# Patient Record
Sex: FEMALE | Race: WHITE | NOT HISPANIC OR LATINO | Employment: UNEMPLOYED | URBAN - METROPOLITAN AREA
[De-identification: names, ages, dates, MRNs, and addresses within clinical notes are randomized per-mention and may not be internally consistent; named-entity substitution may affect disease eponyms.]

---

## 2022-12-28 PROBLEM — R79.89 LFT ELEVATION: Status: ACTIVE | Noted: 2022-12-28

## 2022-12-28 PROBLEM — E87.1 HYPONATREMIA: Status: ACTIVE | Noted: 2022-12-28

## 2022-12-28 PROBLEM — N18.30 CKD (CHRONIC KIDNEY DISEASE) STAGE 3, GFR 30-59 ML/MIN: Status: ACTIVE | Noted: 2022-12-28

## 2022-12-28 PROBLEM — R09.89 SUSPECTED CHF (CONGESTIVE HEART FAILURE): Status: ACTIVE | Noted: 2022-12-28

## 2022-12-28 PROBLEM — D72.829 LEUKOCYTOSIS: Status: ACTIVE | Noted: 2022-12-28

## 2022-12-29 PROBLEM — I50.20 HFREF (HEART FAILURE WITH REDUCED EJECTION FRACTION) (HCC): Status: ACTIVE | Noted: 2022-12-28

## 2022-12-30 PROBLEM — E87.1 HYPONATREMIA: Status: RESOLVED | Noted: 2022-12-28 | Resolved: 2022-12-30

## 2023-01-06 PROBLEM — I10 HYPERTENSION: Status: ACTIVE | Noted: 2023-01-06

## 2023-01-06 PROBLEM — Z76.89 ENCOUNTER TO ESTABLISH CARE: Status: ACTIVE | Noted: 2023-01-06

## 2023-02-22 ENCOUNTER — TELEPHONE (OUTPATIENT)
Dept: CARDIOLOGY | Facility: MEDICAL CENTER | Age: 78
End: 2023-02-22
Payer: MEDICARE

## 2023-02-22 DIAGNOSIS — E87.1 HYPONATREMIA: ICD-10-CM

## 2023-02-23 NOTE — TELEPHONE ENCOUNTER
----- Message from ERIK Moore sent at 2/22/2023  7:41 AM PST -----  I sent a message to both Arnold and Ednisha yesterday.  Please discuss fluid restriction of 2 L daily max with patient and recheck BMP in 1 week.  ----- Message -----  From: Brenda Ryan R.N.  Sent: 2/21/2023   5:03 PM PST  To: ERIK Moore    To JJOSE, FV scheduled with you 5/2/23.  Please advise on sodium level, thank you!

## 2023-02-23 NOTE — TELEPHONE ENCOUNTER
Called pt, [phone], to review APRN recommendations.  Pt verbalizes understanding and states no other concerns or questions at this time.  Answered all questions asked.  Pt is appreciative of information given.    BMP ordered.

## 2023-03-02 ENCOUNTER — TELEPHONE (OUTPATIENT)
Dept: CARDIOLOGY | Facility: MEDICAL CENTER | Age: 78
End: 2023-03-02
Payer: MEDICARE

## 2023-03-02 DIAGNOSIS — E87.1 HYPONATREMIA: ICD-10-CM

## 2023-03-03 NOTE — TELEPHONE ENCOUNTER
Phone Number Called: [phone]    Call outcome: Spoke to patient regarding message below.    Message: Called to inform patient of JG recommendations.     Patient verbalized understanding. Patient reports no symptoms, just feeling dehydrated. No dizziness, or confusion noted.     BMP re-ordered for patient.     ER precautions advised to patient. Patient verbalized understanding.       ----- Message from ERKI Moore sent at 3/2/2023 10:16 AM PST -----  Regarding: Hyponatremia  Hold spironolactone.  Continue fluid restriction, but reduced to 1.5 L.  No sodium restriction.  Recheck BMP nonfasting in 1 week.  ER precautions for confusion, dizziness, disorientation.  ----- Message -----  From: Latosha Rosales R.N.  Sent: 3/2/2023   8:21 AM PST  To: ERIK Moore    To:     Na [address]. Please advise. Thank you

## 2024-01-18 DIAGNOSIS — I50.20 HFREF (HEART FAILURE WITH REDUCED EJECTION FRACTION) (HCC): ICD-10-CM

## 2024-01-18 DIAGNOSIS — I10 HYPERTENSION, UNSPECIFIED TYPE: ICD-10-CM

## 2024-01-18 NOTE — TELEPHONE ENCOUNTER
RICK    Received request via: Patient    Was the patient seen in the last year in this department? Yes    Does the patient have an active prescription (recently filled or refills available) for medication(s) requested?  ABOUT 10 DAYS LEFT    Does the patient have nursing home Plus and need 100 day supply (blood pressure, diabetes and cholesterol meds only)? Patient does not have SCP   [Abnormal CXR/ Chest CT] : an abnormal CXR/ chest CT [Follow-Up - From Hospitalization] : a follow-up visit after a recent hospitalization [COPD] : COPD

## 2024-01-19 RX ORDER — LISINOPRIL 10 MG/1
10 TABLET ORAL DAILY
Qty: 90 TABLET | Refills: 0 | Status: SHIPPED | OUTPATIENT
Start: 2024-01-19 | End: 2024-02-20

## 2024-01-19 RX ORDER — METOPROLOL SUCCINATE 50 MG/1
50 TABLET, EXTENDED RELEASE ORAL DAILY
Qty: 90 TABLET | Refills: 0 | Status: SHIPPED | OUTPATIENT
Start: 2024-01-19 | End: 2024-02-20 | Stop reason: SDUPTHER

## 2025-02-06 ENCOUNTER — TELEPHONE (OUTPATIENT)
Dept: CARDIOLOGY | Facility: MEDICAL CENTER | Age: 80
End: 2025-02-06
Payer: MEDICARE

## 2025-02-06 DIAGNOSIS — I10 HYPERTENSION, UNSPECIFIED TYPE: ICD-10-CM

## 2025-02-06 DIAGNOSIS — I50.20 HFREF (HEART FAILURE WITH REDUCED EJECTION FRACTION) (HCC): ICD-10-CM

## 2025-02-06 RX ORDER — LOSARTAN POTASSIUM 50 MG/1
50 TABLET ORAL EVERY MORNING
Qty: 90 TABLET | Refills: 0 | Status: SHIPPED | OUTPATIENT
Start: 2025-02-06

## 2025-02-06 NOTE — TELEPHONE ENCOUNTER
To AM: RICK KEYES. 90- day courtesy refill sent to pharmacy. Pt will establish with new cardiologist in [place].  Thank you.

## 2025-02-06 NOTE — TELEPHONE ENCOUNTER
Naomie Escalona P.A.-C.  You3 minutes ago (11:41 AM)     AM  Yes that is ok. Please place referral to Dr. Anthony in [place] if needed.     Referral to Dr. Anthony placed.

## 2025-02-06 NOTE — TELEPHONE ENCOUNTER
Pt called to get her Losartan prescription refilled. She states she is almost out of her medication. Pt will be getting set up to see a provider at the [facility] office and does not want to start coming to [place]. However, when she called the [facility] office they told her JG had to be the one to authorize the med refill for now. I have sent a message to MALLY Hall, for assistance. /missy 02/0625

## 2025-02-06 NOTE — TELEPHONE ENCOUNTER
----- Message from EZEQUIEL GARCIA sent at 2/6/2025 10:23 AM PST -----  Hi,    This pt called with a question about her medication - she said that her Losartan needs a new refill. Can you please assist? Her medication is almost out.    Thanks,    Beatriz

## 2025-02-14 NOTE — Clinical Note
REFERRAL APPROVAL NOTICE         Sent on February 14, 2025                   Bharti Dickson  [address] [place] [address]                   Dear Ms. Dickson,    After a careful review of the medical information and benefit coverage, [facility] has processed your referral. See below for additional details.    If applicable, you must be actively enrolled with your insurance for coverage of the authorized service. If you have any questions regarding your coverage, please contact your insurance directly.    REFERRAL INFORMATION   Referral #:  [id number]  Referred-To Provider    Referred-By Provider:  Cardiovascular Disease (Cardiology)    ALISSON Wall CHRISTOPHER J      [address] [facility] [address]  [facility] [address]  [phone] [address] [facility] [place] [id number] [phone]    Referral Start Date:  02/06/2025  Referral End Date:   02/06/2026             SCHEDULING  If you do not already have an appointment, please call [phone] to make an appointment.     MORE INFORMATION  If you do not already have a [facility] account, sign up at: [email or web address]  You can access your medical information, make appointments, see lab results, billing information, and more.  If you have questions regarding this referral, please contact  the [facility] Referrals department at:             [phone]. Monday - Friday 8:00AM - 5:00PM.     Sincerely,    [facility]